# Patient Record
Sex: FEMALE | Race: WHITE | URBAN - METROPOLITAN AREA
[De-identification: names, ages, dates, MRNs, and addresses within clinical notes are randomized per-mention and may not be internally consistent; named-entity substitution may affect disease eponyms.]

---

## 2017-04-05 ENCOUNTER — HOSPITAL PATHOLOGY (OUTPATIENT)
Dept: OTHER | Facility: CLINIC | Age: 62
End: 2017-04-05

## 2017-04-07 LAB — COPATH REPORT: NORMAL

## 2017-07-15 ENCOUNTER — HEALTH MAINTENANCE LETTER (OUTPATIENT)
Age: 62
End: 2017-07-15

## 2019-11-03 ENCOUNTER — HEALTH MAINTENANCE LETTER (OUTPATIENT)
Age: 64
End: 2019-11-03

## 2020-02-10 ENCOUNTER — HEALTH MAINTENANCE LETTER (OUTPATIENT)
Age: 65
End: 2020-02-10

## 2020-11-16 ENCOUNTER — HEALTH MAINTENANCE LETTER (OUTPATIENT)
Age: 65
End: 2020-11-16

## 2021-04-03 ENCOUNTER — HEALTH MAINTENANCE LETTER (OUTPATIENT)
Age: 66
End: 2021-04-03

## 2021-09-18 ENCOUNTER — HEALTH MAINTENANCE LETTER (OUTPATIENT)
Age: 66
End: 2021-09-18

## 2021-11-13 ENCOUNTER — HEALTH MAINTENANCE LETTER (OUTPATIENT)
Age: 66
End: 2021-11-13

## 2022-04-30 ENCOUNTER — HEALTH MAINTENANCE LETTER (OUTPATIENT)
Age: 67
End: 2022-04-30

## 2022-11-19 ENCOUNTER — HEALTH MAINTENANCE LETTER (OUTPATIENT)
Age: 67
End: 2022-11-19

## 2023-06-01 ENCOUNTER — HEALTH MAINTENANCE LETTER (OUTPATIENT)
Age: 68
End: 2023-06-01

## 2023-11-19 ENCOUNTER — HEALTH MAINTENANCE LETTER (OUTPATIENT)
Age: 68
End: 2023-11-19

## 2025-02-25 ENCOUNTER — APPOINTMENT (OUTPATIENT)
Dept: CT IMAGING | Facility: CLINIC | Age: 70
End: 2025-02-25
Attending: STUDENT IN AN ORGANIZED HEALTH CARE EDUCATION/TRAINING PROGRAM
Payer: COMMERCIAL

## 2025-02-25 ENCOUNTER — HOSPITAL ENCOUNTER (EMERGENCY)
Facility: CLINIC | Age: 70
Discharge: HOME | End: 2025-02-25
Attending: STUDENT IN AN ORGANIZED HEALTH CARE EDUCATION/TRAINING PROGRAM | Admitting: STUDENT IN AN ORGANIZED HEALTH CARE EDUCATION/TRAINING PROGRAM
Payer: COMMERCIAL

## 2025-02-25 ENCOUNTER — APPOINTMENT (OUTPATIENT)
Dept: GENERAL RADIOLOGY | Facility: CLINIC | Age: 70
End: 2025-02-25
Attending: STUDENT IN AN ORGANIZED HEALTH CARE EDUCATION/TRAINING PROGRAM
Payer: COMMERCIAL

## 2025-02-25 VITALS
RESPIRATION RATE: 18 BRPM | BODY MASS INDEX: 24.61 KG/M2 | WEIGHT: 147.71 LBS | SYSTOLIC BLOOD PRESSURE: 170 MMHG | OXYGEN SATURATION: 98 % | HEART RATE: 86 BPM | DIASTOLIC BLOOD PRESSURE: 96 MMHG | TEMPERATURE: 97.4 F | HEIGHT: 65 IN

## 2025-02-25 DIAGNOSIS — R00.2 PALPITATIONS: ICD-10-CM

## 2025-02-25 DIAGNOSIS — I10 HYPERTENSION, UNSPECIFIED TYPE: ICD-10-CM

## 2025-02-25 LAB
ANION GAP SERPL CALCULATED.3IONS-SCNC: 10 MMOL/L (ref 7–15)
ATRIAL RATE - MUSE: 74 BPM
BASOPHILS # BLD AUTO: 0.1 10E3/UL (ref 0–0.2)
BASOPHILS NFR BLD AUTO: 1 %
BUN SERPL-MCNC: 16.3 MG/DL (ref 8–23)
CALCIUM SERPL-MCNC: 9.6 MG/DL (ref 8.8–10.4)
CHLORIDE SERPL-SCNC: 100 MMOL/L (ref 98–107)
CREAT SERPL-MCNC: 1.01 MG/DL (ref 0.51–0.95)
D DIMER PPP FEU-MCNC: 0.71 UG/ML FEU (ref 0–0.5)
DIASTOLIC BLOOD PRESSURE - MUSE: NORMAL MMHG
EGFRCR SERPLBLD CKD-EPI 2021: 59 ML/MIN/1.73M2
EOSINOPHIL # BLD AUTO: 0.8 10E3/UL (ref 0–0.7)
EOSINOPHIL NFR BLD AUTO: 8 %
ERYTHROCYTE [DISTWIDTH] IN BLOOD BY AUTOMATED COUNT: 13.6 % (ref 10–15)
GLUCOSE SERPL-MCNC: 100 MG/DL (ref 70–99)
HCO3 SERPL-SCNC: 26 MMOL/L (ref 22–29)
HCT VFR BLD AUTO: 45.1 % (ref 35–47)
HGB BLD-MCNC: 14.5 G/DL (ref 11.7–15.7)
HOLD SPECIMEN: NORMAL
HOLD SPECIMEN: NORMAL
IMM GRANULOCYTES # BLD: 0 10E3/UL
IMM GRANULOCYTES NFR BLD: 0 %
INTERPRETATION ECG - MUSE: NORMAL
LYMPHOCYTES # BLD AUTO: 1.6 10E3/UL (ref 0.8–5.3)
LYMPHOCYTES NFR BLD AUTO: 16 %
MCH RBC QN AUTO: 29.8 PG (ref 26.5–33)
MCHC RBC AUTO-ENTMCNC: 32.2 G/DL (ref 31.5–36.5)
MCV RBC AUTO: 93 FL (ref 78–100)
MONOCYTES # BLD AUTO: 0.9 10E3/UL (ref 0–1.3)
MONOCYTES NFR BLD AUTO: 9 %
NEUTROPHILS # BLD AUTO: 6.7 10E3/UL (ref 1.6–8.3)
NEUTROPHILS NFR BLD AUTO: 66 %
NRBC # BLD AUTO: 0 10E3/UL
NRBC BLD AUTO-RTO: 0 /100
P AXIS - MUSE: 49 DEGREES
PLATELET # BLD AUTO: 377 10E3/UL (ref 150–450)
POTASSIUM SERPL-SCNC: 4.1 MMOL/L (ref 3.4–5.3)
PR INTERVAL - MUSE: 152 MS
QRS DURATION - MUSE: 88 MS
QT - MUSE: 390 MS
QTC - MUSE: 432 MS
R AXIS - MUSE: 1 DEGREES
RBC # BLD AUTO: 4.86 10E6/UL (ref 3.8–5.2)
SODIUM SERPL-SCNC: 136 MMOL/L (ref 135–145)
SYSTOLIC BLOOD PRESSURE - MUSE: NORMAL MMHG
T AXIS - MUSE: 30 DEGREES
TROPONIN T SERPL HS-MCNC: 14 NG/L
TROPONIN T SERPL HS-MCNC: 17 NG/L
VENTRICULAR RATE- MUSE: 74 BPM
WBC # BLD AUTO: 10.2 10E3/UL (ref 4–11)

## 2025-02-25 PROCEDURE — 99285 EMERGENCY DEPT VISIT HI MDM: CPT | Mod: 25

## 2025-02-25 PROCEDURE — 36415 COLL VENOUS BLD VENIPUNCTURE: CPT | Performed by: STUDENT IN AN ORGANIZED HEALTH CARE EDUCATION/TRAINING PROGRAM

## 2025-02-25 PROCEDURE — 85004 AUTOMATED DIFF WBC COUNT: CPT | Performed by: STUDENT IN AN ORGANIZED HEALTH CARE EDUCATION/TRAINING PROGRAM

## 2025-02-25 PROCEDURE — 93005 ELECTROCARDIOGRAM TRACING: CPT

## 2025-02-25 PROCEDURE — 85049 AUTOMATED PLATELET COUNT: CPT | Performed by: STUDENT IN AN ORGANIZED HEALTH CARE EDUCATION/TRAINING PROGRAM

## 2025-02-25 PROCEDURE — 85379 FIBRIN DEGRADATION QUANT: CPT | Performed by: STUDENT IN AN ORGANIZED HEALTH CARE EDUCATION/TRAINING PROGRAM

## 2025-02-25 PROCEDURE — 71046 X-RAY EXAM CHEST 2 VIEWS: CPT

## 2025-02-25 PROCEDURE — 84484 ASSAY OF TROPONIN QUANT: CPT | Performed by: STUDENT IN AN ORGANIZED HEALTH CARE EDUCATION/TRAINING PROGRAM

## 2025-02-25 PROCEDURE — 71250 CT THORAX DX C-: CPT

## 2025-02-25 PROCEDURE — 80048 BASIC METABOLIC PNL TOTAL CA: CPT | Performed by: STUDENT IN AN ORGANIZED HEALTH CARE EDUCATION/TRAINING PROGRAM

## 2025-02-25 ASSESSMENT — ACTIVITIES OF DAILY LIVING (ADL)
ADLS_ACUITY_SCORE: 47

## 2025-02-25 ASSESSMENT — COLUMBIA-SUICIDE SEVERITY RATING SCALE - C-SSRS
2. HAVE YOU ACTUALLY HAD ANY THOUGHTS OF KILLING YOURSELF IN THE PAST MONTH?: NO
1. IN THE PAST MONTH, HAVE YOU WISHED YOU WERE DEAD OR WISHED YOU COULD GO TO SLEEP AND NOT WAKE UP?: NO
6. HAVE YOU EVER DONE ANYTHING, STARTED TO DO ANYTHING, OR PREPARED TO DO ANYTHING TO END YOUR LIFE?: NO

## 2025-02-25 NOTE — ED PROVIDER NOTES
"  Emergency Department Note      History of Present Illness     Chief Complaint   Hypertension      HPI   Rosio Robert is a 69 year old female with a history of hypertension, thyroid disease, bronchiectasis and Guillain [place] syndrome who presents to the ED for hypertension. The patient states that she takes losartan in the morning and amlodipine 3x/wk at night for her hypertension. Adds that they recently changed the  of her amlodipine about a week ago. Patient reports that she woke last night with heart palpitations and a \"funny feeling in the head\". She took her BP during this episode and notes it was 166/100. She endorses shortness of breath when walking/moving and states this is accompanied by an accelerated heart rate, but was able to walk multiple miles yesterday. Denies any chest pain or leg swelling/pain. Further denies any fever, cough, vomiting, diarrhea, sore throat, rhinorrhea, or cough. No history of blood clots. No recent surgeries.    Independent Historian   None    Review of External Notes   None    Past Medical History     Medical History and Problem List   Hypertension  Pneumonia  Thyroid disease  Uncomplicated asthma  Skin cancer  Guillain [place] syndrome    Medications   Tylenol  Culturell  Cozaar  Lopressor  Macrobid  Deltasone  Zyrtec  Synthroid  Nasonex     Surgical History   Past Surgical History:   Procedure Laterality Date    SINUS SURGERY         Physical Exam     Patient Vitals for the past 24 hrs:   BP Temp Temp src Pulse Resp SpO2 Height Weight   02/25/25 1542 (!) 170/96 -- -- 86 -- 98 % -- --   02/25/25 1213 (!) 149/91 -- -- -- -- -- -- --   02/25/25 1117 (!) 190/102 -- -- -- -- -- -- --   02/25/25 1116 -- 97.4  F (36.3  C) Temporal 79 18 98 % 1.651 m (5' 5\") 67 kg (147 lb 11.3 oz)     Physical Exam  GENERAL: Patient well-appearing  HEAD: Atraumatic.  NECK: No rigidity  EYE: PERRL  CV: RRR, no murmurs, rubs or gallops  PULM: CTAB with good aeration; no retractions, " rales, rhonchi, or wheezing  ABD: Soft, nontender, nondistended, no guarding  DERM: No rash. Skin warm and dry  EXTREMITY: Moving all extremities without difficulty. No calf tenderness or peripheral edema.  Ambulatory without issue.  VASCULAR: Symmetric pulses bilaterally      Diagnostics     Lab Results   Labs Ordered and Resulted from Time of ED Arrival to Time of ED Departure   BASIC METABOLIC PANEL - Abnormal       Result Value    Sodium 136      Potassium 4.1      Chloride 100      Carbon Dioxide (CO2) 26      Anion Gap 10      Urea Nitrogen 16.3      Creatinine 1.01 (*)     GFR Estimate 59 (*)     Calcium 9.6      Glucose 100 (*)    TROPONIN T, HIGH SENSITIVITY - Abnormal    Troponin T, High Sensitivity 17 (*)    CBC WITH PLATELETS AND DIFFERENTIAL - Abnormal    WBC Count 10.2      RBC Count 4.86      Hemoglobin 14.5      Hematocrit 45.1      MCV 93      MCH 29.8      MCHC 32.2      RDW 13.6      Platelet Count 377      % Neutrophils 66      % Lymphocytes 16      % Monocytes 9      % Eosinophils 8      % Basophils 1      % Immature Granulocytes 0      NRBCs per 100 WBC 0      Absolute Neutrophils 6.7      Absolute Lymphocytes 1.6      Absolute Monocytes 0.9      Absolute Eosinophils 0.8 (*)     Absolute Basophils 0.1      Absolute Immature Granulocytes 0.0      Absolute NRBCs 0.0     D DIMER QUANTITATIVE - Abnormal    D-Dimer Quantitative 0.71 (*)    TROPONIN T, HIGH SENSITIVITY - Normal    Troponin T, High Sensitivity 14         Imaging   Chest CT w/o contrast   Final Result   IMPRESSION:    1.  Cystic bronchiectasis with extensive mucous plugging with a slight lower lobe predominance. Allergic bronchopulmonary aspergillosis would be a consideration the differential.         XR Chest 2 Views   Final Result   IMPRESSION: Slight increase in linear opacities in the left lung base, may be due to an infectious/inflammatory process. Additional scattered nodular opacities and known bronchiectasis in the bilateral  lung bases and left upper lobe are stable. Consider    chest CT for better evaluation. No pleural effusion or pneumothorax. Normal heart size.          EKG   ECG taken at 1127, ECG read at 1348  Normal sinus rhythm  Minimal voltage criteria for LVH, may be normal variant ([place] product)  Nonspecific ST and T wave abnormality  Abnormal ECG   Rate 74 bpm. AL interval 152 ms. QRS duration 88 ms. QT/QTc 390/432 ms. P-R-T axes 49 1 30.    Independent Interpretation   Chest x-ray-no pneumothorax. Linear opacities bilaterally.    ED Course      Medications Administered   Medications - No data to display    Procedures   Procedures     Discussion of Management   Discussed with pulmonology Dr. Gambino.    ED Course   ED Course as of 02/25/25 1726   Tue Feb 25, 2025   1153 I obtained history and examined the patient as noted above.         Additional Documentation  None    Medical Decision Making / Diagnosis     CMS Diagnoses: None    MIPS       None    MDM   Rosio Robert is a 69 year old female with history of hypertension, on multiple blood pressure medications, presents with concerns of elevated blood pressure and palpitations.  Patient states she always knows when her blood pressure is elevated because she feels tense and anxious.  She is not having chest pain.  She had a troponin drawn at triage which was minimally elevated at 17.  I repeated it and it is 14.  No significant change.  Do not suspect ACS.  ECG with LVH with some repolarization changes, which she has had on ECGs as far back as April 18, 2016.  Low risk by Wells, D-dimer within years criteria, therefore do not think she requires CT PE.  Chest x-ray showed worsening of her potential bronchiectasis, she ordered CT Noncon, which shows chronic changes.  I did discuss with on-call pulmonology and they recommend she follow-up routinely with her pulmonologist but that no emergent management is needed.  Furthermore, patient states when she was treated for this  before in the past she developed Guillain-Barré.  Blood pressure did improve without intervention here.  Do not think this is hypertensive emergency.  Recommended staying well-hydrated.  Recommended she increase her amlodipine from taking 3 times per week to daily at 5 mg and to continue her other blood pressure medications and to talk with her doctor regarding the regimen.  I have evaluated the patient for acute medical emergencies and have clinically decided no further acute medical interventions are required. Reassessed multiple times and well appearing. Patient stable for discharge. All questions answered. Given strict return precautions. Patient content with plan. The differential diagnosis and treatment modalities were discussed thoroughly with the patient. Recommended PCP follow-up in 2-3 days.      Disposition   The patient was discharged.     Diagnosis     ICD-10-CM    1. Hypertension, unspecified type  I10       2. Palpitations  R00.2            Discharge Medications   Discharge Medication List as of 2/25/2025  5:10 PM            Scribe Disclosure:  ORIANA, Veronica Nava, am serving as a scribe at 1:34 PM on 2/25/2025 to document services personally performed by Nader Liao MD based on my observations and the provider's statements to me.        Nader Liao MD  02/25/25 [id number]

## 2025-02-25 NOTE — DISCHARGE INSTRUCTIONS
Return to the emergency department if symptoms are worsening, become concerning, or for any other concerns. Follow-up with your doctor in 2-3 and sooner if needed.    Increase your hydration.    Increase your amlodipine to 5mg daily.    Follow-up with your pulmonologist in the coming week.    Discharge Instructions  Hypertension - High Blood Pressure    During you visit to the Emergency Department, your blood pressure was higher than the recommended blood pressure.  This may be related to stress, pain, medication or other temporary conditions. In these cases, your blood pressure may return to normal on its own. If you have a history of high blood pressure, you may need to have your provider adjust your medications. Sometimes, your high measurement here may indicate that you have developed high blood pressure that will stay high unless it is treated. As a general rule, high blood pressure causes problems over years rather than days, weeks, or months. So, while it is important to treat blood pressure, it is rarely important to treat blood pressure immediately. Occasionally we will begin a medication in the Emergency Department; more often we will recommend close follow-up for medications with a primary doctor/clinic.    Generally, every Emergency Department visit should have a follow-up clinic visit with either a primary or a specialty clinic/provider. Please follow-up as instructed by your emergency provider today.    Return to the Emergency Department if you start to have:  A severe headache.  Chest pain.  Shortness of breath.  Weakness or numbness that affects one part of the body.  Confusion.  Vision changes.  Significant swelling of legs and/or eyes.  A reaction to any medication started in the Emergency Department.    What can I do to help myself?  Avoid alcohol.  Take any blood pressure medicine that you are prescribed.  Get a good night s sleep.  Lower your salt intake.  Exercise.  Lose weight.  Manage  stress.  See your doctor regularly    If blood pressure medication was started in the Emergency Department:  The medicine may not have an immediate effect. The body and brain determine what blood pressure you have. The medicine s job is to retrain the body s  thermostat  to a lower blood pressure.  You will need to follow up with your provider to see how this medicine is working for you.  If you were given a prescription for medicine here today, be sure to read all of the information (including the package insert) that comes with your prescription.  This will include important information about the medicine, its side effects, and any warnings that you need to know about.  The pharmacist who fills the prescription can provide more information and answer questions you may have about the medicine.  If you have questions or concerns that the pharmacist cannot address, please call or return to the Emergency Department.   Remember that you can always come back to the Emergency Department if you are not able to see your regular provider in the amount of time listed above, if you get any new symptoms, or if there is anything that worries you.

## 2025-02-25 NOTE — ED TRIAGE NOTES
Pt arrives for hypertension, first noticed today. BP at home 160s/100. She takes amlodipine at night, states the  switched a week ago. She reports feeling dizziness when standing. Denies chest pain/headache or vision changes. Hypertensive to 190s systolic in triage.

## 2025-08-23 ENCOUNTER — HEALTH MAINTENANCE LETTER (OUTPATIENT)
Age: 70
End: 2025-08-23